# Patient Record
Sex: MALE | Race: WHITE | Employment: UNEMPLOYED | URBAN - METROPOLITAN AREA
[De-identification: names, ages, dates, MRNs, and addresses within clinical notes are randomized per-mention and may not be internally consistent; named-entity substitution may affect disease eponyms.]

---

## 2017-11-14 ENCOUNTER — OFFICE VISIT (OUTPATIENT)
Dept: CARDIOLOGY CLINIC | Age: 57
End: 2017-11-14

## 2017-11-14 VITALS
HEIGHT: 69 IN | OXYGEN SATURATION: 97 % | HEART RATE: 76 BPM | BODY MASS INDEX: 27.55 KG/M2 | DIASTOLIC BLOOD PRESSURE: 72 MMHG | SYSTOLIC BLOOD PRESSURE: 113 MMHG | WEIGHT: 186 LBS

## 2017-11-14 DIAGNOSIS — I42.8 OTHER CARDIOMYOPATHY (HCC): ICD-10-CM

## 2017-11-14 DIAGNOSIS — I48.0 PAF (PAROXYSMAL ATRIAL FIBRILLATION) (HCC): ICD-10-CM

## 2017-11-14 DIAGNOSIS — I10 ESSENTIAL HYPERTENSION WITH GOAL BLOOD PRESSURE LESS THAN 140/90: ICD-10-CM

## 2017-11-14 DIAGNOSIS — I25.83 CORONARY ARTERY DISEASE DUE TO LIPID RICH PLAQUE: Primary | ICD-10-CM

## 2017-11-14 DIAGNOSIS — I25.10 CORONARY ARTERY DISEASE DUE TO LIPID RICH PLAQUE: Primary | ICD-10-CM

## 2017-11-14 NOTE — MR AVS SNAPSHOT
Visit Information Date & Time Provider Department Dept. Phone Encounter #  
 11/14/2017  2:30 PM Mehrdad West MD [address] [place] [address] Specialist at [facility] [phone] [id number] Follow-up Instructions Return in about 1 year (around 11/14/2018). Upcoming Health Maintenance Date Due Hepatitis C Screening 1960 FOBT Q 1 YEAR AGE 50-75 5/6/2008 Influenza Age 5 to Adult 8/1/2017 DTaP/Tdap/Td series (2 - Td) 10/1/2024 Allergies as of 11/14/2017  Review Complete On: 10/6/2016 By: Marissa Yañez RN No Known Allergies Current Immunizations  Never Reviewed Name Date Tdap 10/1/2014  9:47 PM  
  
 Not reviewed this visit Vitals BP Pulse Height(growth percentile) Weight(growth percentile) SpO2 BMI  
 113/72 76 5' 9\" (1.753 m) 186 lb (84.4 kg) 97% 27.47 kg/m2 Smoking Status Former Smoker BMI and BSA Data Body Mass Index Body Surface Area  
 27.47 kg/m 2 2.03 m 2 Preferred Pharmacy Pharmacy Name Phone [facility] [address], [address] [place] [address] [phone] Your Updated Medication List  
  
   
This list is accurate as of: 11/14/17  2:59 PM.  Always use your most recent med list.  
  
  
  
  
 atorvastatin 80 mg tablet Commonly known as:  LIPITOR  
1 tablet by Per G Tube route nightly. clopidogrel 75 mg Tab Commonly known as:  PLAVIX Take 1 tablet by mouth daily. escitalopram oxalate 20 mg tablet Commonly known as:  Aziza Melo Take 20 mg by mouth daily. levETIRAcetam 1,000 mg tablet Take 1 tablet by mouth two (2) times a day. metoprolol tartrate 25 mg tablet Commonly known as:  LOPRESSOR  
  
 TYLENOL PO Take  by mouth as needed. Follow-up Instructions Return in about 1 year (around 11/14/2018). Patient Instructions Stop lisinopril Introducing [facility] SERVICES! [employer] introduces Ditto patient portal. Now you can access parts of your medical record, email your doctor's office, and request medication refills online. 1. In your internet browser, go to [email or web address]. Tarisa/[employer] 2. Click on the First Time User? Click Here link in the Sign In box. You will see the New Member Sign Up page. 3. Enter your Ditto Access Code exactly as it appears below. You will not need to use this code after youve completed the sign-up process. If you do not sign up before the expiration date, you must request a new code. · Ditto Access Code: [email or web address] Expires: 2/12/2018  2:58 PM 
 
4. Enter the last four digits of your Social Security Number (xxxx) and Date of Birth (mm/dd/yyyy) as indicated and click Submit. You will be taken to the next sign-up page. 5. Create a Ditto ID. This will be your Ditto login ID and cannot be changed, so think of one that is secure and easy to remember. 6. Create a Ditto password. You can change your password at any time. 7. Enter your Password Reset Question and Answer. This can be used at a later time if you forget your password. 8. Enter your e-mail address. You will receive e-mail notification when new information is available in [address] 9. Click Sign Up. You can now view and download portions of your medical record. 10. Click the Download Summary menu link to download a portable copy of your medical information. If you have questions, please visit the Frequently Asked Questions section of the Ditto website. Remember, Ditto is NOT to be used for urgent needs. For medical emergencies, dial 911. Now available from your iPhone and Android! Please provide this summary of care documentation to your next provider. Your primary care clinician is listed as Dash Gaston. If you have any questions after today's visit, please call [phone].

## 2017-11-14 NOTE — PROGRESS NOTES
[facility]      As you know, Mr. George Tavera is a 59 y.o. male with a history of cardiopulmonary arrest, status post successful resuscitation, coronary artery disease, transient episode of atrial fibrillation in 2014, hyperlipidemia. Mr. George Tavera is here today for follow up appointment. He denies any symptoms to suggest angina or heart failure. He is accompanied by his parents. He does not perform any regular exercise according to patient himself and the parents. He denies any symptoms. He takes his medications regularly. Overall there is no change in his status since last time. Denies any nausea, vomiting, abdominal pain, fever, chills, sputum production. No hematuria or other bleeding complaints    Past Medical History:   Diagnosis Date    H/O cardiac arrest 09/14    Cardioversion X 6 for VT/VF    HLD (hyperlipidemia)     Hypertension     Memory deficits     Secondary to anoxic brain injury and cardiac arrest     [facility]    Paroxysmal a-fib (HCC)        No past surgical history on file. Current Outpatient Prescriptions   Medication Sig    metoprolol tartrate (LOPRESSOR) 25 mg tablet     lisinopril (PRINIVIL, ZESTRIL) 2.5 mg tablet Take 1 Tab by mouth daily.  ACETAMINOPHEN (TYLENOL PO) Take  by mouth as needed.  escitalopram oxalate (LEXAPRO) 20 mg tablet Take 20 mg by mouth daily.  clopidogrel (PLAVIX) 75 mg tablet Take 1 tablet by mouth daily.  atorvastatin (LIPITOR) 80 mg tablet 1 tablet by Per G Tube route nightly.  levetiracetam 1,000 mg tablet Take 1 tablet by mouth two (2) times a day. (Patient taking differently: Take 750 mg by mouth daily.)     No current facility-administered medications for this visit. Allergies and Sensitivities:  No Known Allergies    Family History:  No family history on file.     Social History:  Social History   Substance Use Topics    Smoking status: Former Smoker    Smokeless tobacco: Never Used    Alcohol use No     He  reports that he has quit smoking. He has never used smokeless tobacco.  He  reports that he does not drink alcohol. Review of Systems:  Cardiac symptoms as noted above in HPI. All others negative. Denies skin rash, joint pain, blurring vision, photophobia, neck pain, hemoptysis, chronic cough, nausea, vomiting, hematuria, burning micturition, BRBPR, chronic headaches. Physical Exam:  BP Readings from Last 3 Encounters:   11/14/17 113/72   10/06/16 102/66   03/14/16 123/46         Pulse Readings from Last 3 Encounters:   11/14/17 76   10/06/16 60   03/14/16 (!) 59          Wt Readings from Last 3 Encounters:   11/14/17 186 lb (84.4 kg)   10/06/16 156 lb (70.8 kg)   01/07/16 172 lb (78 kg)       Constitutional: Oriented to person, place, and time. HENT: Head: Normocephalic and atraumatic. Neck: No JVD present. Carotid bruit is not appreciated. Cardiovascular: Regular rhythm. No murmur, gallop or rubs appreciated  Lung: Breath sounds normal. No respiratory distress. No ronchi or rales appreciated  Abdominal: No tenderness. No rebound and no guarding. Musculoskeletal: There is no lower extremity edema.  No cynosis    Review of Data  LABS:   Lab Results   Component Value Date/Time    Sodium 140 03/14/2016 04:38 PM    Potassium 3.5 03/14/2016 04:38 PM    Chloride 104 03/14/2016 04:38 PM    CO2 26 03/14/2016 04:38 PM    Glucose 82 03/14/2016 04:38 PM    BUN 16 03/14/2016 04:38 PM    Creatinine 0.93 03/14/2016 04:38 PM     Lipids Latest Ref Rng & Units 9/8/2014   Chol, Total <200 MG/   HDL 40 - 60 MG/DL 31(L)   LDL 0 - 100 MG/DL 33.6   Trig <150 MG/(H)   Chol/HDL Ratio 0 - 5.0   3.3   Some recent data might be hidden     Lab Results   Component Value Date/Time    ALT (SGPT) 16 03/14/2016 04:38 PM     Lab Results   Component Value Date/Time    Hemoglobin A1c 6.0 09/08/2014 03:00 PM EKG    ECHO (09/12/2014)   Left ventricle: Systolic function was severely reduced. Ejection fractionwas estimated in the range of 15 % to 20 %. There was severe diffuse  hypokinesis. Doppler parameters were consistent with abnormal leftventricular relaxation (grade 1 diastolic dysfunction). Right ventricle: Systolic function was reduced. Mitral valve: There was mild annular calcification. Aortic valve: There was no stenosis. Tricuspid valve: There was no significant regurgitation. Aorta, systemic arteries: Not well visualized. ECHO (09/8/2014)  Left ventricle: Systolic function was at the lower limits of normal by visual assessment. Ejection fraction was estimated to be 50 %. There was  hypokinesis of the apical wall(s). Features were consistent with a pseudonormal left ventricular filling pattern, with concomitant abnormal  relaxation and increased filling pressure (grade 2 diastolic dysfunction). COMPARISONS:  Comparison was made with the previous study of 08-Sep-2014. LV overall function has increased. CATHETERIZATION (09/2014)  - PRESSURE HEMODYNAMICS: Left ventricular end-diastolic pressure was 26 mmHg. There was no evidence of aortic stenosis. - Estimated left ventricular ejection fraction was ~40%. There was possible diaphragmatic wall hypokinesis. No mitral   Regurgitation. Apical wall motion was not appreciated well because of underfilling   - LM: Short, but angiographically normal.   - LAD: Heavy calcification of the mid LAD. The mid LAD probably had eccentric 70% disease after D1 ( appreciable best in caudal view). D1 branch is a large bifurcating branch with likely eccentric 70% stenosis of the inferior branch, which appears only in the Malay caudal view. Otherwise, mid to distal LAD has no significant obstructive disease. Large septal perforators are noted. 3. LCX: Large and dominant. The native circumflex is large vessel without disease. OM1 is small.  OM2 probably small to medium caliber vessel;   however, appears to be occluded ostially and proximally. There was no collateral. OM3 and OM4 has no significant obstructive disease. Left PDA   has no significant obstructive disease. 4 RCA:Small non-dominant, Prox 40%, mid 50-60%    IMPRESSION & PLAN:  Mr. Alonso Apodaca is a 59 y.o. male with coronary artery disease, hypertension, hyperlipidemia. Coronary artery disease:  Mr. Alonso Apodaca had a cardiopulmonary arrest and had a cardiac catheterization as mentioned above in September, 2014, with moderate CAD. Cotninue medical management for now. No angina. On Plavix, BB and statin. Transient cardiomyopathy:  Last ejection fraction normalized. 50% ef in 09/2014. No evidence of fluid overload. ContinueToprol XL. Ok to stop Lisinopril as BP at homes at times runs lower side 100s. Hypertension:  Currently on BB and lisinopril. Ok to stop Lisinopril as BP at homes at times runs lower side 100s. Advised to keep checking BP at home. Transient atrial fibrillation:  In September, 2014, patient had an episode of atrial fibrillation in the setting of acute illness. Appears to be in sinus rhythm on exam again today. He is on Plavix for stroke prophylaxis. Importance of diet and exercise was discussed with patient. This plan was discussed with patient and parents who is in agreement. Thank you for allowing me to participate in patient care. Please feel free to call me if you have any question or concern.

## 2017-11-14 NOTE — PROGRESS NOTES
1. Have you been to the ER, urgent care clinic since your last visit? Hospitalized since your last visit? No    2. Have you seen or consulted any other health care providers outside of the [address] [place] [address] since your last visit? Include any pap smears or colon screening.  No

## 2018-11-16 ENCOUNTER — OFFICE VISIT (OUTPATIENT)
Dept: CARDIOLOGY CLINIC | Age: 58
End: 2018-11-16

## 2018-11-16 VITALS
WEIGHT: 180 LBS | OXYGEN SATURATION: 96 % | HEIGHT: 69 IN | DIASTOLIC BLOOD PRESSURE: 70 MMHG | HEART RATE: 55 BPM | SYSTOLIC BLOOD PRESSURE: 118 MMHG | BODY MASS INDEX: 26.66 KG/M2

## 2018-11-16 DIAGNOSIS — I10 ESSENTIAL HYPERTENSION: Primary | ICD-10-CM

## 2018-11-16 NOTE — PROGRESS NOTES
Cardiovascular Specialists      As you know, Mr. Shiloh Curtis is a 59 y.o. male with a history of cardiopulmonary arrest, status post successful resuscitation, coronary artery disease, transient episode of atrial fibrillation in 2014, hyperlipidemia. Mr. Shiloh Curtis is here today for follow up appointment. Denies any resting or exertional chest pain or chest pressure to suggest angina or any dyspnea to suggest heart failure. No presyncope or syncope  Denies any PND or LE edema. Taking all medications regularly. Per parents, not doing much of activity. He just watches TV and walks little bit around house. Has anger issue. Denies any nausea, vomiting, abdominal pain, fever, chills, sputum production. No hematuria or other bleeding complaints    Past Medical History:   Diagnosis Date    H/O cardiac arrest 09/14    Cardioversion X 6 for VT/VF    HLD (hyperlipidemia)     Hypertension     Memory deficits     Secondary to anoxic brain injury and cardiac arrest     [facility]STEM[facility]non-ST elevated [facility] 09[facility]14    Paroxysmal A-fib ([facility]        No past surgical history on file. Current Outpatient Medications   Medication Sig    metoprolol tartrate (LOPRESSOR) 25 mg tablet     ACETAMINOPHEN (TYLENOL PO) Take  by mouth as needed.  escitalopram oxalate (LEXAPRO) 20 mg tablet Take 20 mg by mouth daily.  clopidogrel (PLAVIX) 75 mg tablet Take 1 tablet by mouth daily.  atorvastatin (LIPITOR) 80 mg tablet 1 tablet by Per G Tube route nightly.  levetiracetam 1,000 mg tablet Take 1 tablet by mouth two (2) times a day. (Patient taking differently: Take 750 mg by mouth daily.)     No current facility-administered medications for this visit. Allergies and Sensitivities:  No Known Allergies    Family History:  No family history on file.     Social History:  Social History     Tobacco Use    Smoking status: Former Smoker    Smokeless tobacco: Never Used   Substance Use Topics    Alcohol use: No    Drug use: No     He  reports that he has quit smoking. he has never used smokeless tobacco.  He  reports that he does not drink alcohol. Review of Systems:  Cardiac symptoms as noted above in HPI. All others negative. Denies skin rash, joint pain, blurring vision, photophobia, neck pain, hemoptysis, chronic cough, nausea, vomiting, hematuria, burning micturition, BRBPR, chronic headaches. Physical Exam:  BP Readings from Last 3 Encounters:   11/16/18 118/70   11/14/17 113/72   10/06/16 102/66         Pulse Readings from Last 3 Encounters:   11/16/18 (!) 55   11/14/17 76   10/06/16 60          Wt Readings from Last 3 Encounters:   11/16/18 180 lb (81.6 kg)   11/14/17 186 lb (84.4 kg)   10/06/16 156 lb (70.8 kg)       Constitutional: Oriented to person, place, and time. HENT: Head: Normocephalic and atraumatic. Neck: No JVD present. Carotid bruit is not appreciated. Cardiovascular: Regular rhythm. No murmur, gallop or rubs appreciated  Lung: Breath sounds normal. No respiratory distress. No ronchi or rales appreciated  Abdominal: No tenderness. No rebound and no guarding. Musculoskeletal: There is no lower extremity edema. No cynosis    Review of Data  LABS:   Lab Results   Component Value Date/Time    Sodium 140 03/14/2016 04:38 PM    Potassium 3.5 03/14/2016 04:38 PM    Chloride 104 03/14/2016 04:38 PM    CO2 26 03/14/2016 04:38 PM    Glucose 82 03/14/2016 04:38 PM    BUN 16 03/14/2016 04:38 PM    Creatinine 0.93 03/14/2016 04:38 PM     No flowsheet data found. Lab Results   Component Value Date/Time    ALT (SGPT) 16 03/14/2016 04:38 PM     Lab Results   Component Value Date/Time    Hemoglobin A1c 6.0 09/08/2014 03:00 PM       EKG    ECHO (09/12/2014)   Left ventricle: Systolic function was severely reduced. Ejection fractionwas estimated in the range of 15 % to 20 %. There was severe diffuse  hypokinesis.  Doppler parameters were consistent with abnormal leftventricular relaxation (grade 1 diastolic dysfunction). Right ventricle: Systolic function was reduced. Mitral valve: There was mild annular calcification. Aortic valve: There was no stenosis. Tricuspid valve: There was no significant regurgitation. Aorta, systemic arteries: Not well visualized. ECHO (09/8/2014)  Left ventricle: Systolic function was at the lower limits of normal by visual assessment. Ejection fraction was estimated to be 50 %. There was  hypokinesis of the apical wall(s). Features were consistent with a pseudonormal left ventricular filling pattern, with concomitant abnormal  relaxation and increased filling pressure (grade 2 diastolic dysfunction). COMPARISONS:  Comparison was made with the previous study of 08-Sep-2014. LV overall function has increased. CATHETERIZATION (09/2014)  - PRESSURE HEMODYNAMICS: Left ventricular end-diastolic pressure was 26 mmHg. There was no evidence of aortic stenosis. - Estimated left ventricular ejection fraction was ~40%. There was possible diaphragmatic wall hypokinesis. No mitral   Regurgitation. Apical wall motion was not appreciated well because of underfilling   - LM: Short, but angiographically normal.   - LAD: Heavy calcification of the mid LAD. The mid LAD probably had eccentric 70% disease after D1 ( appreciable best in caudal view). D1 branch is a large bifurcating branch with likely eccentric 70% stenosis of the inferior branch, which appears only in the [place] caudal view. Otherwise, mid to distal LAD has no significant obstructive disease. Large septal perforators are noted. 3. LCX: Large and dominant. The native circumflex is large vessel without disease. OM1 is small. OM2 probably small to medium caliber vessel;   however, appears to be occluded ostially and proximally. There was no collateral. OM3 and OM4 has no significant obstructive disease. Left PDA   has no significant obstructive disease.    4 RCA:Small non-dominant, Prox 40%, mid 50-60%    IMPRESSION & PLAN:  Mr. Sherri Hernandez is a 59 y.o. male with coronary artery disease, hypertension, hyperlipidemia. Coronary artery disease:  Mr. Sherri Hernandez had a cardiopulmonary arrest and had a cardiac catheterization as mentioned above in September, 2014, with moderate CAD. Cotninue medical management for now. No angina. On Plavix, BB and statin. Transient cardiomyopathy:  Last ejection fraction normalized. 50% ef in 09/2014. No evidence of fluid overload. ContinueToprol XL. OFf lisinopril in past because of low BP    Hypertension:  Currently on BB and lisinopril. Transient atrial fibrillation:  In September, 2014, patient had an episode of atrial fibrillation in the setting of acute illness. Appears to be in sinus rhythm on exam today. He is on Plavix for stroke prophylaxis. Importance of diet and exercise was discussed with patient and parents. This plan was discussed with patient and parents who is in agreement. Thank you for allowing me to participate in patient care. Please feel free to call me if you have any question or concern.

## 2018-11-16 NOTE — PROGRESS NOTES
1. Have you been to the ER, urgent care clinic since your last visit? Hospitalized since your last visit? No    2. Have you seen or consulted any other health care providers outside of the [address] [place] [address] since your last visit? Include any pap smears or colon screening.  No

## 2019-08-16 ENCOUNTER — APPOINTMENT (OUTPATIENT)
Dept: CT IMAGING | Age: 59
End: 2019-08-16
Attending: EMERGENCY MEDICINE
Payer: MEDICARE

## 2019-08-16 ENCOUNTER — HOSPITAL ENCOUNTER (EMERGENCY)
Age: 59
Discharge: HOME | End: 2019-08-16
Attending: EMERGENCY MEDICINE | Admitting: EMERGENCY MEDICINE
Payer: MEDICARE

## 2019-08-16 VITALS
RESPIRATION RATE: 17 BRPM | WEIGHT: 178 LBS | DIASTOLIC BLOOD PRESSURE: 61 MMHG | HEART RATE: 75 BPM | HEIGHT: 68 IN | SYSTOLIC BLOOD PRESSURE: 115 MMHG | BODY MASS INDEX: 26.98 KG/M2 | TEMPERATURE: 99 F | OXYGEN SATURATION: 96 %

## 2019-08-16 DIAGNOSIS — L02.91 SOFT TISSUE ABSCESS: Primary | ICD-10-CM

## 2019-08-16 DIAGNOSIS — M54.2 NECK PAIN: ICD-10-CM

## 2019-08-16 DIAGNOSIS — L72.0 INCLUSION CYST: ICD-10-CM

## 2019-08-16 LAB
ANION GAP BLD CALC-SCNC: 16 MMOL/L (ref 10–20)
BUN BLD-MCNC: 15 MG/DL (ref 7–18)
CA-I BLD-MCNC: 1.18 MMOL/L (ref 1.12–1.32)
CHLORIDE BLD-SCNC: 98 MMOL/L (ref 100–108)
CO2 BLD-SCNC: 30 MMOL/L (ref 19–24)
CREAT UR-MCNC: 0.9 MG/DL (ref 0.6–1.3)
GLUCOSE BLD STRIP.AUTO-MCNC: 76 MG/DL (ref 74–106)
HCT VFR BLD CALC: 46 % (ref 36–49)
HGB BLD-MCNC: 15.6 G/DL (ref 12–16)
POTASSIUM BLD-SCNC: 4.2 MMOL/L (ref 3.5–5.5)
SODIUM BLD-SCNC: 138 MMOL/L (ref 136–145)

## 2019-08-16 PROCEDURE — 74011250637 HC RX REV CODE- 250/637: Performed by: EMERGENCY MEDICINE

## 2019-08-16 PROCEDURE — 99283 EMERGENCY DEPT VISIT LOW MDM: CPT

## 2019-08-16 PROCEDURE — 70491 CT SOFT TISSUE NECK W/DYE: CPT

## 2019-08-16 PROCEDURE — 80047 BASIC METABLC PNL IONIZED CA: CPT

## 2019-08-16 PROCEDURE — 75810000289 HC I&D ABSCESS SIMP/COMP/MULT

## 2019-08-16 PROCEDURE — 74011636320 HC RX REV CODE- 636/320: Performed by: EMERGENCY MEDICINE

## 2019-08-16 RX ORDER — CEPHALEXIN 500 MG/1
500 CAPSULE ORAL 4 TIMES DAILY
Qty: 28 CAP | Refills: 0 | Status: SHIPPED | OUTPATIENT
Start: 2019-08-16 | End: 2019-08-23

## 2019-08-16 RX ORDER — SULFAMETHOXAZOLE AND TRIMETHOPRIM 800; 160 MG/1; MG/1
1 TABLET ORAL 2 TIMES DAILY
Qty: 14 TAB | Refills: 0 | Status: SHIPPED | OUTPATIENT
Start: 2019-08-16 | End: 2019-08-23

## 2019-08-16 RX ORDER — SULFAMETHOXAZOLE AND TRIMETHOPRIM 800; 160 MG/1; MG/1
1 TABLET ORAL
Status: COMPLETED | OUTPATIENT
Start: 2019-08-16 | End: 2019-08-16

## 2019-08-16 RX ORDER — CEPHALEXIN 250 MG/1
500 CAPSULE ORAL
Status: COMPLETED | OUTPATIENT
Start: 2019-08-16 | End: 2019-08-16

## 2019-08-16 RX ADMIN — CEPHALEXIN 500 MG: 250 CAPSULE ORAL at 17:43

## 2019-08-16 RX ADMIN — SULFAMETHOXAZOLE AND TRIMETHOPRIM 1 TABLET: 800; 160 TABLET ORAL at 17:43

## 2019-08-16 RX ADMIN — IOPAMIDOL 80 ML: 612 INJECTION, SOLUTION INTRAVENOUS at 16:03

## 2019-08-16 NOTE — ED TRIAGE NOTES
Had bump on right side of neck for years. Family noted this morning lump red, warm and painful and 4x larger.

## 2019-08-16 NOTE — ED PROVIDER NOTES
[address]  EMERGENCY DEPARTMENT HISTORY AND PHYSICAL EXAM       Date: 8/16/2019   Patient Name: Floridalma Kang   YOB: 1960  Medical Record Number: [id number]    HISTORY OF PRESENTING ILLNESS:     Floridalma Kang is a 62 y.o. male presenting with the noted PMH to the ED c/o acute enlargement of his chronic posterior right neck \"lump\" that has been present for years but appeared acutely inflamed approximately 2 days ago, enlarging today 4 times is normal size with associated redness and \"coming to ahead\" per the patient's father. Patient has a history of MI with anoxic brain injury and resulting poor short-term memory. He has lived with his parents for the last 5 years. Primary Care Provider: Aram Rojas MD   Specialist:    Past Medical History:   Past Medical History:   Diagnosis Date    H/O cardiac arrest 09/14    Cardioversion X 6 for VT/VF    HLD (hyperlipidemia)     Hypertension     Memory deficits     Secondary to anoxic brain injury and cardiac arrest     NSTEMI (non-ST elevated myocardial infarction) ([facility] [address] 09/14    Paroxysmal A-fib ([facility] [address]         Past Surgical History:   History reviewed. No pertinent surgical history. Social History:   Social History     Tobacco Use    Smoking status: Former Smoker    Smokeless tobacco: Never Used   Substance Use Topics    Alcohol use: No    Drug use: No        Allergies:   No Known Allergies     REVIEW OF SYSTEMS:  Review of Systems   Constitutional: Negative for chills and fever. HENT: Negative for ear pain and sore throat. Eyes: Negative for pain and visual disturbance. Respiratory: Negative for cough and shortness of breath. Cardiovascular: Negative for chest pain and palpitations. Gastrointestinal: Negative for abdominal pain, diarrhea, nausea and vomiting. Genitourinary: Negative for flank pain. Musculoskeletal: Negative for back pain and neck pain. Skin: Positive for color change and wound. Neurological: Negative for syncope and headaches. Psychiatric/Behavioral: Negative for agitation. The patient is not nervous/anxious. PHYSICAL EXAM:  Vitals:    08/16/19 1320   BP: 115/61   Pulse: 75   Resp: 17   Temp: 99 °F (37.2 °C)   SpO2: 96%   Weight: 80.7 kg (178 lb)   Height: 5' 8\" (1.727 m)         Physical Exam   Constitutional: He is oriented to person, place, and time. He appears well-developed and well-nourished. HENT:   Head: Normocephalic and atraumatic. Mouth/Throat: Oropharynx is clear and moist.   Eyes: Pupils are equal, round, and reactive to light. No scleral icterus. Neck: Neck supple. No tracheal deviation present. Cardiovascular: Regular rhythm. No murmur heard. Pulmonary/Chest: Effort normal and breath sounds normal. No respiratory distress. Abdominal: Soft. There is no tenderness. Musculoskeletal: Normal range of motion. He exhibits no deformity. Neurological: He is alert and oriented to person, place, and time. No cranial nerve deficit. No gross neuro deficit   Skin: Skin is warm and dry. No rash noted. He is not diaphoretic. There is erythema (3 x 3 cm area of induration, warmth and erythema to the right posterior neck with small area of central fluctuance/pustule). Psychiatric: He has a normal mood and affect. Nursing note and vitals reviewed.     Medications   iopamidol (ISOVUE 300) 61 % contrast injection 80 mL (80 mL IntraVENous Given 8/16/19 1603)   cephALEXin (KEFLEX) capsule 500 mg (500 mg Oral Given 8/16/19 1743)   trimethoprim-sulfamethoxazole (BACTRIM DS, SEPTRA DS) 160-800 mg per tablet 1 Tab (1 Tab Oral Given 8/16/19 1743)       RESULTS:    Labs -   Labs Reviewed   POC CHEM8 - Abnormal; Notable for the following components:       Result Value    CO2, POC 30 (*)     Chloride, POC 98 (*)     All other components within normal limits     Radiologic Studies -  CT NECK SOFT TISSUE W CONT   Final Result   IMPRESSION:         1. CT findings in keeping with a localized subcutaneous abscess within the   paramedian posterior right neck soft tissues. No evidence of intramuscular   extension. 2. Multiple prior dental extractions. Prominent periapical abscess surrounding   the remaining left maxillary incisor          MEDICAL DECISION MAKING      62 y.o. male with noted past medical history who presented with painful, erythematous and warm posterior neck mass. Due to the chronicity of this I am most suspicious for a inclusion cyst vs lipoma with bacterial superinfection. The induration is quite extensive, will obtain CT imaging to evaluate for depth. Patient will require oral antibiotics, possible stab incision I&D and dermatology follow-up for cyst removal. Infected cyst, localized subcutaneous abscess on CT. I&D Abcess Simple  Date/Time: 8/26/2019 6:46 AM  Performed by: Marivel Beckwith DO  Authorized by: Marivel Beckwith DO     Consent:     Consent obtained:  Verbal and written    Consent given by:  Patient and parent    Risks discussed:  Bleeding, incomplete drainage, pain and infection    Alternatives discussed:  No treatment and delayed treatment  Location:     Type:  Cyst    Size:  3cm    Location:  Neck    Neck location:  R posterior  Pre-procedure details:     Skin preparation:  Chloraprep  Anesthesia (see MAR for exact dosages): Anesthesia method:  Local infiltration    Local anesthetic:  Lidocaine 1% w/o epi  Procedure type:     Complexity:  Simple  Procedure details:     Needle aspiration: no      Incision types:  Single straight    Incision depth:  Dermal    Scalpel blade:  15    Wound management:  Probed and deloculated and irrigated with saline    Drainage:  Bloody and purulent    Drainage amount: Moderate (3 cc)    Wound treatment:  Wound left open    Packing materials:  1/4 in gauze  Post-procedure details:     Patient tolerance of procedure: Tolerated well, no immediate complications      Wound check in 48 hours.      Patient has no new complaints, changes, or physical findings. Results were reviewed with the patient. Pt's questions and concerns were addressed. Care plan was outlined, including follow-up with PCP/specialist and return precautions were discussed. Patient is felt to be stable for discharge at this time. Diagnosis   Clinical Impression:   1. Soft tissue abscess    2. Neck pain    3. Inclusion cyst           Follow-up Information     Follow up With Specialties Details Why [facility] EMERGENCY DEPT Emergency Medicine  If symptoms worsen, For wound re-check in 2-3 days or he can go to his  [address] [place] [address] Dermatology [facility]    [address] [address]  [phone]          Discharge Medication List as of 8/16/2019  6:03 PM      START taking these medications    Details   trimethoprim-sulfamethoxazole (BACTRIM DS) 160-800 mg per tablet Take 1 Tab by mouth two (2) times a day for 7 days. , Print, Disp-14 Tab, R-0      cephALEXin (KEFLEX) 500 mg capsule Take 1 Cap by mouth four (4) times daily for 7 days. , Print, Disp-28 Cap, R-0         CONTINUE these medications which have NOT CHANGED    Details   metoprolol tartrate (LOPRESSOR) 25 mg tablet Historical Med      escitalopram oxalate (LEXAPRO) 20 mg tablet Take 20 mg by mouth daily. , Historical Med      clopidogrel (PLAVIX) 75 mg tablet Take 1 tablet by mouth daily. , No Print, Disp-30 tablet, R-0      atorvastatin (LIPITOR) 80 mg tablet 1 tablet by Per G Tube route nightly., No Print, Disp-1 tablet, R-0      levetiracetam 1,000 mg tablet Take 1 tablet by mouth two (2) times a day., No Print, Disp-1 tablet, R-0      ACETAMINOPHEN (TYLENOL PO) Take  by mouth as needed., Historical Med             _______________________________   Attestations:

## 2019-08-16 NOTE — DISCHARGE INSTRUCTIONS
Patient Education      Please take your antibiotics as prescribed and follow-up with a dermatologist as soon as possible to be evaluated for an inclusion cyst.     Skin Abscess: Care Instructions  Your Care Instructions    A skin abscess is a bacterial infection that forms a pocket of pus. A boil is a kind of skin abscess. The doctor may have cut an opening in the abscess so that the pus can drain out. You may have gauze in the cut so that the abscess will stay open and keep draining. You may need antibiotics. You will need to follow up with your doctor to make sure the infection has gone away. The doctor has checked you carefully, but problems can develop later. If you notice any problems or new symptoms, get medical treatment right away. Follow-up care is a key part of your treatment and safety. Be sure to make and go to all appointments, and call your doctor if you are having problems. It's also a good idea to know your test results and keep a list of the medicines you take. How can you care for yourself at home? · Apply warm and dry compresses, a heating pad set on low, or a hot water bottle 3 or 4 times a day for pain. Keep a cloth between the heat source and your skin. · If your doctor prescribed antibiotics, take them as directed. Do not stop taking them just because you feel better. You need to take the full course of antibiotics. · Take pain medicines exactly as directed. ? If the doctor gave you a prescription medicine for pain, take it as prescribed. ? If you are not taking a prescription pain medicine, ask your doctor if you can take an over-the-counter medicine. · Keep your bandage clean and dry. Change the bandage whenever it gets wet or dirty, or at least one time a day. · If the abscess was packed with gauze:  ? Keep follow-up appointments to have the gauze changed or removed.  If the doctor instructed you to remove the gauze, follow the instructions you were given for how to remove it.  ? After the gauze is removed, soak the area in warm water for 15 to 20 minutes 2 times a day, until the wound closes. When should you call for help? Call your doctor now or seek immediate medical care if:    · You have signs of worsening infection, such as:  ? Increased pain, swelling, warmth, or redness. ? Red streaks leading from the infected skin. ? Pus draining from the wound. ? A fever.    Watch closely for changes in your health, and be sure to contact your doctor if:    · You do not get better as expected. Where can you learn more? Go to http://nataliia-ryan.info/. Enter V208 in the search box to learn more about \"Skin Abscess: Care Instructions. \"  Current as of: April 1, 2019  Content Version: 12.1  © 3451-8179 [employer]. Care instructions adapted under license by [employer] (which disclaims liability or warranty for this information). If you have questions about a medical condition or this instruction, always ask your healthcare professional. [address] any warranty or liability for your use of this information.